# Patient Record
Sex: MALE | Race: ASIAN | URBAN - METROPOLITAN AREA
[De-identification: names, ages, dates, MRNs, and addresses within clinical notes are randomized per-mention and may not be internally consistent; named-entity substitution may affect disease eponyms.]

---

## 2017-03-12 DIAGNOSIS — R09.89 LABILE BLOOD PRESSURE: ICD-10-CM

## 2017-03-13 RX ORDER — LISINOPRIL AND HYDROCHLOROTHIAZIDE 10; 12.5 MG/1; MG/1
TABLET ORAL
Qty: 30 TAB | Refills: 0 | Status: SHIPPED | OUTPATIENT
Start: 2017-03-13 | End: 2017-04-20 | Stop reason: SDUPTHER

## 2017-04-19 ENCOUNTER — TELEPHONE (OUTPATIENT)
Dept: FAMILY MEDICINE CLINIC | Age: 37
End: 2017-04-19

## 2017-04-19 NOTE — TELEPHONE ENCOUNTER
Received, call from the patient patient requested a refill patient was informed by his pharmacy that he needs to call our [facility] office       Please call patient back    Call routed to call back nurse and Mauricio Webb 61, PA      [facility]

## 2017-04-19 NOTE — TELEPHONE ENCOUNTER
The pt was called. After reviewing the chart. The pt has not been seen in the clinic since 06/30/2016. The provider at that time had instructed the pt to return in 6 month's. The pt was supposed to return around 12/30/16 for f/u. In March the pt had been approved by provider for a refill on his BP medication for 30 days with no refills. The pt is asking today for more refills of the Lisinopril/HCTZ. The pt stated this medication was working very well for him, but he went to  his Rx and the Pharmacist stated that he could not get it,and  that he needed to contact the [facility] office and request more refills. The pt was told he needed to return to the [facility] and be seen by a provider. He was given the dates and addresses of our less popular sites. The pt asked for an appt. The pt was told the clinic is primarily a walk in clinic, and only the providers were able to give appt's to pt's. He would need to make the line. The pt was requesting to be seen by the same provider that saw him last yr. He was given the sites where this provider would be working this week and next week. At the less busy sites. The pt stated he would come to the [facility] site Fri 04/28/17.  He was told to try to be there by Ruben Gan RN

## 2017-04-20 ENCOUNTER — HOSPITAL ENCOUNTER (OUTPATIENT)
Dept: LAB | Age: 37
Discharge: HOME | End: 2017-04-20

## 2017-04-20 ENCOUNTER — OFFICE VISIT (OUTPATIENT)
Dept: FAMILY MEDICINE CLINIC | Age: 37
End: 2017-04-20

## 2017-04-20 ENCOUNTER — TELEPHONE (OUTPATIENT)
Dept: FAMILY MEDICINE CLINIC | Age: 37
End: 2017-04-20

## 2017-04-20 VITALS
SYSTOLIC BLOOD PRESSURE: 106 MMHG | WEIGHT: 149 LBS | DIASTOLIC BLOOD PRESSURE: 89 MMHG | BODY MASS INDEX: 26.4 KG/M2 | TEMPERATURE: 98.6 F | HEART RATE: 77 BPM | HEIGHT: 63 IN

## 2017-04-20 DIAGNOSIS — R09.89 LABILE BLOOD PRESSURE: Primary | ICD-10-CM

## 2017-04-20 DIAGNOSIS — M79.605 PAIN OF LEFT LOWER EXTREMITY: ICD-10-CM

## 2017-04-20 DIAGNOSIS — Z13.1 SCREENING FOR DIABETES MELLITUS: ICD-10-CM

## 2017-04-20 DIAGNOSIS — R09.89 LABILE BLOOD PRESSURE: ICD-10-CM

## 2017-04-20 LAB
ANION GAP BLD CALC-SCNC: 7 MMOL/L (ref 5–15)
BUN SERPL-MCNC: 11 MG/DL (ref 6–20)
BUN/CREAT SERPL: 14 (ref 12–20)
CALCIUM SERPL-MCNC: 9.1 MG/DL (ref 8.5–10.1)
CHLORIDE SERPL-SCNC: 99 MMOL/L (ref 97–108)
CO2 SERPL-SCNC: 32 MMOL/L (ref 21–32)
CREAT SERPL-MCNC: 0.76 MG/DL (ref 0.7–1.3)
GLUCOSE POC: NORMAL MG/DL
GLUCOSE SERPL-MCNC: 69 MG/DL (ref 65–100)
POTASSIUM SERPL-SCNC: 4.5 MMOL/L (ref 3.5–5.1)
SODIUM SERPL-SCNC: 138 MMOL/L (ref 136–145)

## 2017-04-20 PROCEDURE — 80048 BASIC METABOLIC PNL TOTAL CA: CPT | Performed by: NURSE PRACTITIONER

## 2017-04-20 RX ORDER — LISINOPRIL AND HYDROCHLOROTHIAZIDE 10; 12.5 MG/1; MG/1
TABLET ORAL
Qty: 90 TAB | Refills: 3 | Status: SHIPPED | OUTPATIENT
Start: 2017-04-20

## 2017-04-20 NOTE — TELEPHONE ENCOUNTER
The patient requests a copy of today's lab results to be mailed to him at home please when they are available. Routing to callback.  Chris Mason RN

## 2017-04-20 NOTE — PROGRESS NOTES
Patient came to discharge, but stated he had another appointment this afternoon and could not wait to speak with the discharge nurse. I called him at home and reviewed the AVS and prescription information with him. We briefly discussed the recommended DASH diet as well. His AVS will be sent back to [facility] to be mailed to the patient. He also asked for his lab results to be mailed to him so I will send a message to the callback pool.  Ramos Pelaez RN

## 2017-04-20 NOTE — PROGRESS NOTES
Coordination of Care  1. Have you been to the ER, urgent care clinic since your last visit? Hospitalized since your last visit? No    2. Have you seen or consulted any other health care providers outside of the [address] [place] [address] since your last visit? Include any pap smears or colon screening.  No    Medications  Medication Reconciliation Performed: no  Patient does need refills     Learning Assessment Complete? yes  Results for orders placed or performed in visit on 04/20/17   AMB POC GLUCOSE BLOOD, BY GLUCOSE MONITORING DEVICE   Result Value Ref Range    Glucose POC fasting 74 mg/dL

## 2017-04-20 NOTE — PROGRESS NOTES
Subjective:     Mickey Mireles is a 40 y.o. male who presents for follow up of hypertension. Diet and Lifestyle: generally follows a low fat low cholesterol diet, exercises sporadically, nonsmoker  Home BP Monitoring: is well controlled at home, ranging 110's/80's    Cardiovascular ROS: taking medications as instructed, no medication side effects noted, no TIA's, no chest pain on exertion, no dyspnea on exertion, no swelling of ankles. New concerns: Pt has run out of his medication as of yesterday. Reports Rx is well tolerated and feels like it is a good fit. Has had a chronic L leg pain where his calf muscle will move to the side and gets painful. Resolves w/ positioning and massage. No Hx of trauma nor surg. Pt denies edema. There is no problem list on file for this patient. There are no active problems to display for this patient.     Current Outpatient Prescriptions   Medication Sig Dispense Refill    lisinopril-hydroCHLOROthiazide (PRINZIDE, ZESTORETIC) 10-12.5 mg per tablet TAKE ONE-HALF TABLET BY MOUTH ONCE DAILY FOR  LABILE  BLOOD  PRESSURE 30 Tab 0     No Known Allergies     Lab Results  Component Value Date/Time   Hemoglobin A1c 5.2 06/23/2016 09:11 AM   Glucose 78 06/23/2016 09:11 AM   Glucose POC fasting 74 04/20/2017 11:23 AM   LDL, calculated 79.2 06/23/2016 09:11 AM   Creatinine 0.78 06/23/2016 09:11 AM      Lab Results  Component Value Date/Time   Cholesterol, total 162 06/23/2016 09:11 AM   HDL Cholesterol 38 06/23/2016 09:11 AM   LDL, calculated 79.2 06/23/2016 09:11 AM   Triglyceride 224 06/23/2016 09:11 AM   CHOL/HDL Ratio 4.3 06/23/2016 09:11 AM       Lab Results   Component Value Date/Time    Sodium 138 06/23/2016 09:11 AM    Potassium 4.6 06/23/2016 09:11 AM    Chloride 103 06/23/2016 09:11 AM    CO2 30 06/23/2016 09:11 AM    Anion gap 5 06/23/2016 09:11 AM    Glucose 78 06/23/2016 09:11 AM    BUN 8 06/23/2016 09:11 AM    Creatinine 0.78 06/23/2016 09:11 AM BUN/Creatinine ratio 10 06/23/2016 09:11 AM    GFR est AA >60 06/23/2016 09:11 AM    GFR est non-AA >60 06/23/2016 09:11 AM    Calcium 8.7 06/23/2016 09:11 AM    Bilirubin, total 0.6 06/23/2016 09:11 AM    ALT (SGPT) 68 06/23/2016 09:11 AM    AST (SGOT) 25 06/23/2016 09:11 AM    Alk. phosphatase 64 06/23/2016 09:11 AM    Protein, total 7.7 06/23/2016 09:11 AM    Albumin 4.0 06/23/2016 09:11 AM    Globulin 3.7 06/23/2016 09:11 AM    A-G Ratio 1.1 06/23/2016 09:11 AM         Review of Systems, additional:  Pertinent items are noted in HPI. Objective:     Visit Vitals    /89 (BP 1 Location: Right arm, BP Patient Position: Sitting)    Pulse 77    Temp 98.6 °F (37 °C) (Oral)    Ht 5' 3.39\" (1.61 m)    Wt 149 lb (67.6 kg)    BMI 26.07 kg/m2     Appearance: alert, well appearing, and in no distress, oriented to person, place, and time, normal appearing weight, acyanotic, in no respiratory distress and well hydrated. General exam: CVS exam BP noted to be well controlled today in office, S1, S2 normal, no gallop, no murmur, chest clear, no JVD, no HSM, no edema. L leg temp WNL, calf muscle in place ankle and knee ROM WNL. Lab review: orders written for new lab studies as appropriate; see orders. Assessment/Plan:     hypertension well controlled, stable, no significant medication side effects noted. current treatment plan is effective, no change in therapy  lab results and schedule of future lab studies reviewed with patient  orders and follow up as documented in patient record  reviewed diet, exercise and weight control. ICD-10-CM ICD-9-CM    1. Labile blood pressure R09.89 796.2 lisinopril-hydroCHLOROthiazide (PRINZIDE, ZESTORETIC) 10-12.5 mg per tablet      METABOLIC PANEL, BASIC   2. Screening for diabetes mellitus Z13.1 V77.1 AMB POC GLUCOSE BLOOD, BY GLUCOSE MONITORING DEVICE   3. Pain of left lower extremity M79.605 729.5      Cont POC for HTN. Check renal function and K+ today.      Advised Pt to get regular CV exercise and do several sets of calf raises before and after to strengthen his calf muscle for relief. RTC PRN. Pt expressed understanding of the treatment plan and repeated back instructions for medications, labs and follow-up.

## 2017-04-20 NOTE — PATIENT INSTRUCTIONS

## 2017-04-24 NOTE — TELEPHONE ENCOUNTER
Lab results mailed to patient as requested by the patient with a note added from the provider to continue the plan of care.